# Patient Record
(demographics unavailable — no encounter records)

---

## 2017-03-13 NOTE — REP
Right upper quadrant sonography:

 

History:  Right upper quadrant pain.

 

Findings:  Scanning through the right upper quadrant of the abdomen demonstrates

a nonshadowing 0.4 cm polyp along the dependent wall of the gallbladder.  There

is extensive increased echogenicity in the liver parenchyma consistent with fatty

infiltration.  There is a geographic area centrally in the right lobe of the

liver consistent with fat sparing.  No mass lesion is seen.  Common bile duct is

normal measuring 0.4 cm in greatest diameter.  No pancreatic abnormality is seen.

The pancreatic tail is obscured by abdominal gas.  There is no evidence of

ascites.  The right kidney measures 10.5 x 5.0 x 5.7 cm.  No right renal

abnormality is observed.

 

Impression:

 

1.  0.4 cm gallbladder wall polyp.

 

2.  Fatty infiltration of the liver with areas of fat sparing in the right lobe.

 

 

Signed by

Nacho Chinchilla MD 03/13/2017 07:18 P